# Patient Record
(demographics unavailable — no encounter records)

---

## 2025-03-26 NOTE — HISTORY OF PRESENT ILLNESS
[FreeTextEntry1] : 66F presents for yearly screening duplex of bilateral carotids. She had a R CEA in 2012. Denies vision changes such as those associated with amaurosis fugax, numbness or tingling in any extremity, weakness, slurred speech or any additional stroke symptoms. Has no complaints today. Currently taking an aspirin and a statin.  Referred for AIOD from cardiology has previously placed left SITA stent had an angiogram concerning for progression of disease around stent; had CTA done which cause an anaphylactic reaction. [de-identified] : Ms. Tipton today is complaining mostly of left-sided buttock hip and thigh pain and fatigue she states that this is aggravated by inclined walking up stairs and long distance walking but it does interfere with her job she works as a  in a high school.  The pain is severe but is not burning or shooting pain unclear if it is truly a claudication like pain or an ischemic leg pain.  She does have a tired sensation in the hip as well.  She has past medical history of both a left iliac artery stent but also has extensive spine and history in the lumbar spine.  She states that she has recently had an ileostomy reversal which prompted worsened pain and then it improved but then after her CT scan the pain has worsened yet again it appears to be more episodic than constant.  She is still smoking about a pack a day and is compliant with her antiplatelet anticoagulation regimen.

## 2025-03-26 NOTE — ASSESSMENT
[FreeTextEntry1] : 68yo female active smoker with known carotid stenosis (s/p CEA) and AIOD s/p left SITA stent; overall doing well left high/buttock symptoms may be neurologic or musculoskeletal based on description and physical exam; furthermore the KY/PVR appears near normal with very mild disease -I called and discussed her car with Dr. Rashaun Bran who agrees this may be non-vascular in nature  -would start cilostazol -recommend re-eval by Dr. Clay for spinal source of symptoms -continue to encourage smoking cessation  -will follow up in 1 month for surveillance duplex of carotid

## 2025-03-26 NOTE — PHYSICAL EXAM
[0] : left 0 [1+] : right 1+ [2+] : left 2+ [Ankle Swelling (On Exam)] : present [Ankle Swelling Bilaterally] : bilaterally  [Ankle Swelling On The Right] : mild [FreeTextEntry1] : strong palpable DP pulses b/l and feet warm with <2s cap refill no signs of ischemia

## 2025-03-31 NOTE — DISCUSSION/SUMMARY
[de-identified] : Lumbar degenerative disc disease, left hip pain  Extensive discussion of the natural history of this issue was had with the patient.  We discussed the treatment options focusing on conservative therapy which includes anti-inflammatories, physical therapy/home exercise, & activity modification.  -A prescription for a Medrol Dosepak with the appropriate warnings and side effects was given to the patient. -Physical therapy prescription was given to the patient. We discussed red flag symptoms and that the patient should immediately report to the emergency department if these occur.  Recommend patient follow-up with her neurosurgeon as well as vascular.  She has any issues with the hip she should return.

## 2025-03-31 NOTE — PHYSICAL EXAM
[de-identified] : Left hip Nontender to palpation No pain with deep flexion and internal rotation, normal range of motion No pain with resisted abduction, adduction, hip flexion, knee flexion Negative straight leg raise [de-identified] : 3/31/25: AP Pelvis and lateral view of the left hip were reviewed and demonstrate well-preserved joint space, partially visualized lumbar spine shows extensive degeneration

## 2025-03-31 NOTE — HISTORY OF PRESENT ILLNESS
[de-identified] : 3/31/25: Patient presents with left low back and hip pain.  This been going for years.  She has pain on the lateral aspect of her hip after 50 steps, feels tired and has to rest.  States if she stops moving the pain does improve, does not have to necessarily sit down.  States this pain is similar to what she has had in the past with her back.  Does not have significant pain while at rest.  Does note she has different blood pressures in her left versus right arm, left is lower, likely thoracic outlet syndrome.  Taking Tylenol for the back.  Did physical therapy in the past which has helped.  Is being worked up by vascular as well.  History of an ileostomy that was reversed.  Feels the pain began sometime around that surgery.  Relevant PMH: CKD, hypertension, A-fib Relevant allergies: Contrast-anaphylaxis AC: Eliquis Hx of DVT/PE: Denies Nicotine: Cigarettes

## 2025-04-30 NOTE — HISTORY OF PRESENT ILLNESS
[FreeTextEntry1] : 87 y/o female with past medical history significant for HTN, mild left ventricular systolic dysfunction, trigeminal neuralgia, chronic back pain s/p, neuropathic pain both feet (follows with neurology), presenting for purple discoloration in bilateral feet and pain in legs when ambulating. KY's done in October demonstrated mild PAD (R 1.1, L .97) She endorses associated swelling especially with prolonged dependency. She denies ischemic rest pain but complains of resting neuropathic pain. [de-identified] : Presenting today for surveillance duplex of bilateral carotid arteries;